# Patient Record
Sex: MALE | URBAN - METROPOLITAN AREA
[De-identification: names, ages, dates, MRNs, and addresses within clinical notes are randomized per-mention and may not be internally consistent; named-entity substitution may affect disease eponyms.]

---

## 2017-03-06 ENCOUNTER — APPOINTMENT (OUTPATIENT)
Age: 72
Setting detail: DERMATOLOGY
End: 2017-03-06

## 2017-03-06 VITALS — HEIGHT: 73 IN | WEIGHT: 189 LBS

## 2017-03-06 DIAGNOSIS — L57.8 OTHER SKIN CHANGES DUE TO CHRONIC EXPOSURE TO NONIONIZING RADIATION: ICD-10-CM

## 2017-03-06 DIAGNOSIS — L30.0 NUMMULAR DERMATITIS: ICD-10-CM

## 2017-03-06 DIAGNOSIS — L82.1 OTHER SEBORRHEIC KERATOSIS: ICD-10-CM

## 2017-03-06 PROCEDURE — OTHER FOLLOW UP FOR NEXT VISIT: OTHER

## 2017-03-06 PROCEDURE — OTHER PRESCRIPTION: OTHER

## 2017-03-06 PROCEDURE — 99203 OFFICE O/P NEW LOW 30 MIN: CPT

## 2017-03-06 PROCEDURE — OTHER TREATMENT REGIMEN: OTHER

## 2017-03-06 PROCEDURE — OTHER COUNSELING: OTHER

## 2017-03-06 PROCEDURE — OTHER REASSURANCE: OTHER

## 2017-03-06 RX ORDER — TRIAMCINOLONE ACETONIDE 1 MG/G
THIN COAT CREAM TOPICAL BID
Qty: 1 | Refills: 2 | Status: ERX | COMMUNITY
Start: 2017-03-06

## 2017-03-06 ASSESSMENT — LOCATION DETAILED DESCRIPTION DERM
LOCATION DETAILED: RIGHT DISTAL CALF
LOCATION DETAILED: RIGHT SUPERIOR UPPER BACK
LOCATION DETAILED: RIGHT MEDIAL UPPER BACK
LOCATION DETAILED: PERIUMBILICAL SKIN
LOCATION DETAILED: LEFT LATERAL SUPERIOR CHEST

## 2017-03-06 ASSESSMENT — LOCATION ZONE DERM
LOCATION ZONE: LEG
LOCATION ZONE: TRUNK

## 2017-03-06 ASSESSMENT — SEVERITY ASSESSMENT: SEVERITY: MILD

## 2017-03-06 ASSESSMENT — LOCATION SIMPLE DESCRIPTION DERM
LOCATION SIMPLE: CHEST
LOCATION SIMPLE: ABDOMEN
LOCATION SIMPLE: RIGHT UPPER BACK
LOCATION SIMPLE: RIGHT CALF

## 2017-03-06 NOTE — PROCEDURE: TREATMENT REGIMEN
Detail Level: Simple
Plan: This is consistent with a small patch of eczema, slightly thickened due to scratching. The topical steroid should make this better fairly quickly and he can use this as needed in the future. Patient will call if no improvement or if he has to use the medication constantly just to keep it under control
Plan: Minimal to no evidence of any visual sun damage, no freckling, no pre-cancers, no skin cancers
Otc Regimen: Dove, Aveeno
Detail Level: Generalized

## 2017-03-06 NOTE — PROCEDURE: REASSURANCE
Include Location In Plan?: Yes
Additional Note: Patient reassured these are benign, no treatment needed, follow up if any changes or irritation
Detail Level: Zone

## 2018-08-10 ENCOUNTER — APPOINTMENT (OUTPATIENT)
Age: 73
Setting detail: DERMATOLOGY
End: 2018-08-13

## 2018-08-10 VITALS — HEIGHT: 73 IN

## 2018-08-10 DIAGNOSIS — L82.1 OTHER SEBORRHEIC KERATOSIS: ICD-10-CM

## 2018-08-10 DIAGNOSIS — L57.8 OTHER SKIN CHANGES DUE TO CHRONIC EXPOSURE TO NONIONIZING RADIATION: ICD-10-CM

## 2018-08-10 DIAGNOSIS — L30.0 NUMMULAR DERMATITIS: ICD-10-CM

## 2018-08-10 PROCEDURE — OTHER COUNSELING: OTHER

## 2018-08-10 PROCEDURE — OTHER TREATMENT REGIMEN: OTHER

## 2018-08-10 PROCEDURE — 99214 OFFICE O/P EST MOD 30 MIN: CPT

## 2018-08-10 PROCEDURE — OTHER FOLLOW UP FOR NEXT VISIT: OTHER

## 2018-08-10 PROCEDURE — OTHER MIPS QUALITY: OTHER

## 2018-08-10 PROCEDURE — OTHER REASSURANCE: OTHER

## 2018-08-10 PROCEDURE — OTHER SUNSCREEN RECOMMENDATIONS: OTHER

## 2018-08-10 ASSESSMENT — LOCATION DETAILED DESCRIPTION DERM
LOCATION DETAILED: LEFT CENTRAL MALAR CHEEK
LOCATION DETAILED: RIGHT KNEE
LOCATION DETAILED: PERIUMBILICAL SKIN
LOCATION DETAILED: STERNUM
LOCATION DETAILED: INFERIOR THORACIC SPINE
LOCATION DETAILED: LEFT KNEE
LOCATION DETAILED: LEFT PROXIMAL DORSAL FOREARM
LOCATION DETAILED: RIGHT DISTAL CALF
LOCATION DETAILED: RIGHT PROXIMAL DORSAL FOREARM

## 2018-08-10 ASSESSMENT — LOCATION SIMPLE DESCRIPTION DERM
LOCATION SIMPLE: LEFT CHEEK
LOCATION SIMPLE: LEFT KNEE
LOCATION SIMPLE: UPPER BACK
LOCATION SIMPLE: LEFT FOREARM
LOCATION SIMPLE: RIGHT KNEE
LOCATION SIMPLE: RIGHT FOREARM
LOCATION SIMPLE: RIGHT CALF
LOCATION SIMPLE: ABDOMEN
LOCATION SIMPLE: CHEST

## 2018-08-10 ASSESSMENT — LOCATION ZONE DERM
LOCATION ZONE: ARM
LOCATION ZONE: TRUNK
LOCATION ZONE: LEG
LOCATION ZONE: FACE

## 2018-08-10 ASSESSMENT — SEVERITY ASSESSMENT: SEVERITY: MILD

## 2018-08-10 ASSESSMENT — BSA RASH: BSA RASH: 2

## 2018-08-10 ASSESSMENT — PAIN INTENSITY VAS: HOW INTENSE IS YOUR PAIN 0 BEING NO PAIN, 10 BEING THE MOST SEVERE PAIN POSSIBLE?: NO PAIN

## 2018-08-10 NOTE — PROCEDURE: REASSURANCE
Detail Level: Zone
Additional Note: Normal basic skin exam today, no evidence of skin cancer or precancer’s. I did encourage the patient to follow up annually for a full body skin exam
Detail Level: Simple
Include Location In Plan?: Yes
Additional Note: Patient reassured these are benign, no treatment needed. Follow up if these change in size or become irritated

## 2018-08-10 NOTE — PROCEDURE: TREATMENT REGIMEN
Detail Level: Simple
Otc Regimen: Dove, Aveeno
Samples Given: Eucrisa, Ultravate lotion
Discontinue Regimen: TAC 0.1% cream for now
Plan: Patient again has patches of dryness on the right and left lateral legs. This appears to be simply some form of mild eczema. He did say the triamcinolone helped a little bit but he ran out of the medication fairly quickly. So I am going to give him a stronger topical steroid to try, if he likes the effect of the medication he can call for a generic prescription. I also want him to try the Eucrisa twice daily and we will send in a prescription for that if he calls to say this is helping. I also strongly encouraged him to stop using Axe body wash and to use dove unscented soap along with Aveeno lotion twice daily. Obviously part of his symptomatology is likely due to his chronic cirrhosis. Regarding follow-up patient is going to start seeing my father in [place] as this is much closer to where he lives

## 2019-04-17 ENCOUNTER — RX ONLY (RX ONLY)
Age: 74
End: 2019-04-17

## 2019-04-17 ENCOUNTER — NEW SKIN PROBLEM (OUTPATIENT)
Dept: URBAN - METROPOLITAN AREA CLINIC 16 | Facility: CLINIC | Age: 74
Setting detail: DERMATOLOGY
End: 2019-04-17

## 2019-04-17 PROBLEM — D18.01 HEMANGIOMA OF SKIN AND SUBCUTANEOUS TISSUE: Status: RESOLVED | Noted: 2019-04-17

## 2019-04-17 PROBLEM — L28.0 LICHEN SIMPLEX CHRONICUS: Status: RESOLVED | Noted: 2019-04-17

## 2019-04-17 PROBLEM — L82.1 OTHER SEBORRHEIC KERATOSIS: Status: RESOLVED | Noted: 2019-04-17

## 2019-04-17 PROCEDURE — 99203 OFFICE O/P NEW LOW 30 MIN: CPT

## 2019-04-17 RX ORDER — TRIAMCINOLONE ACETONIDE 1 MG/G
1 APPLICATION CREAM TOPICAL BID
Qty: 454 | Refills: 5
Start: 2019-04-17

## 2020-01-16 ENCOUNTER — APPOINTMENT (OUTPATIENT)
Age: 75
Setting detail: DERMATOLOGY
End: 2020-01-16

## 2020-01-16 VITALS — HEIGHT: 73 IN | WEIGHT: 170 LBS

## 2020-01-16 DIAGNOSIS — L85.8 OTHER SPECIFIED EPIDERMAL THICKENING: ICD-10-CM

## 2020-01-16 PROCEDURE — 99214 OFFICE O/P EST MOD 30 MIN: CPT

## 2020-01-16 PROCEDURE — OTHER TREATMENT REGIMEN: OTHER

## 2020-01-16 PROCEDURE — OTHER MIPS QUALITY: OTHER

## 2020-01-16 PROCEDURE — OTHER FOLLOW UP FOR NEXT VISIT: OTHER

## 2020-01-16 ASSESSMENT — LOCATION ZONE DERM: LOCATION ZONE: ARM

## 2020-01-16 ASSESSMENT — LOCATION DETAILED DESCRIPTION DERM: LOCATION DETAILED: RIGHT POSTERIOR SHOULDER

## 2020-01-16 ASSESSMENT — LOCATION SIMPLE DESCRIPTION DERM: LOCATION SIMPLE: RIGHT SHOULDER

## 2020-01-16 NOTE — PROCEDURE: TREATMENT REGIMEN
Detail Level: Detailed
Plan: Essentially this appears benign, consistent with a hyperkeratotic papule likely due to friction. No evidence of infection, no specific treatment needed. But I did recommend for the patient to  some scar therapy or scar away something he can place over the lesion when he sleeps at night to help reduce friction. This should lead to resolution. If not Patient will call or follow up

## 2020-05-04 ENCOUNTER — APPOINTMENT (OUTPATIENT)
Age: 75
Setting detail: DERMATOLOGY
End: 2020-05-05

## 2020-05-04 DIAGNOSIS — L92.0 GRANULOMA ANNULARE: ICD-10-CM

## 2020-05-04 PROCEDURE — OTHER FOLLOW UP FOR NEXT VISIT: OTHER

## 2020-05-04 PROCEDURE — 99214 OFFICE O/P EST MOD 30 MIN: CPT

## 2020-05-04 PROCEDURE — OTHER PRESCRIPTION: OTHER

## 2020-05-04 PROCEDURE — OTHER COUNSELING: OTHER

## 2020-05-04 PROCEDURE — OTHER TREATMENT REGIMEN: OTHER

## 2020-05-04 RX ORDER — CLOBETASOL PROPIONATE 0.5 MG/G
THIN COAT OINTMENT TOPICAL BID
Qty: 1 | Refills: 1 | Status: ERX | COMMUNITY
Start: 2020-05-04

## 2020-05-04 ASSESSMENT — BSA RASH: BSA RASH: 1

## 2020-05-04 ASSESSMENT — LOCATION ZONE DERM: LOCATION ZONE: FEET

## 2020-05-04 ASSESSMENT — LOCATION SIMPLE DESCRIPTION DERM: LOCATION SIMPLE: LEFT FOOT

## 2020-05-04 ASSESSMENT — LOCATION DETAILED DESCRIPTION DERM: LOCATION DETAILED: LEFT DORSAL FOOT

## 2020-05-04 ASSESSMENT — SEVERITY ASSESSMENT: SEVERITY: MILD

## 2020-05-04 NOTE — HPI: RASH
What Type Of Note Output Would You Prefer (Optional)?: Bullet Format
Is The Patient Presenting As Previously Scheduled?: Yes
How Severe Is Your Rash?: mild
Is This A New Presentation, Or A Follow-Up?: Rash
Additional History: Patient with a red, scaly, itchy rash on the top of his foot for the past one month. No other skin surfaces are affected

## 2020-05-04 NOTE — PROCEDURE: TREATMENT REGIMEN
Detail Level: Simple
Plan: Prescription for Clobetasol ointment twice a day for the next 2 to 4 weeks. He will call or follow up if no improvement or if there is any worsening

## 2020-06-02 ENCOUNTER — RX ONLY (RX ONLY)
Age: 75
End: 2020-06-02

## 2020-06-02 RX ORDER — TRIAMCINOLONE ACETONIDE 1 MG/G
THIN COAT CREAM TOPICAL DAILY
Qty: 1 | Refills: 2 | Status: ERX

## 2020-11-30 ENCOUNTER — APPOINTMENT (OUTPATIENT)
Age: 75
Setting detail: DERMATOLOGY
End: 2020-12-01

## 2020-11-30 DIAGNOSIS — L72.8 OTHER FOLLICULAR CYSTS OF THE SKIN AND SUBCUTANEOUS TISSUE: ICD-10-CM

## 2020-11-30 PROCEDURE — OTHER FOLLOW UP FOR NEXT VISIT: OTHER

## 2020-11-30 PROCEDURE — OTHER TREATMENT REGIMEN: OTHER

## 2020-11-30 PROCEDURE — 99213 OFFICE O/P EST LOW 20 MIN: CPT | Mod: 95

## 2020-11-30 ASSESSMENT — LOCATION SIMPLE DESCRIPTION DERM: LOCATION SIMPLE: RIGHT TEMPLE

## 2020-11-30 ASSESSMENT — LOCATION ZONE DERM: LOCATION ZONE: FACE

## 2020-11-30 ASSESSMENT — LOCATION DETAILED DESCRIPTION DERM: LOCATION DETAILED: RIGHT MEDIAL TEMPLE

## 2020-11-30 NOTE — PROCEDURE: TREATMENT REGIMEN
Detail Level: Detailed
Plan: From the photos and today’s video visit this appears consistent with a sebaceous cyst. There is no current active inflammation. Patient will monitor and call or follow up if this worsens. Of note he is about to leave for the [facility] on Friday for a liver transplantation so he will make sure he has the physicians there take a look at the lesion. However, he can follow up with me anytime

## 2020-11-30 NOTE — HPI: EVALUATION OF SKIN LESION(S)
What Type Of Note Output Would You Prefer (Optional)?: Bullet Format
How Severe Are Your Spot(S)?: moderate
Have Your Spot(S) Been Treated In The Past?: has not been treated
Hpi Title: Evaluation of a Skin Lesion
Additional History: TeleHealth via Zoom. Patient had a raised, tender what sounds like sebaceous cyst on his right temple approximately one week ago. It has since improved but he wanted to make sure it didn’t represent anything dangerous. He did use antibiotic ointment and warm compresses which seem to help. There was no spontaneous drainage. He does have a previous history of sebaceous cyst in the area

## 2021-11-18 NOTE — HPI: SKIN LESIONS
How Severe Is Your Skin Lesion?: mild
Have Your Skin Lesions Been Treated?: not been treated
Is This A New Presentation, Or A Follow-Up?: Growth
Additional History: Patient has a raised, rough, slightly tender lesion on the back of his right shoulder. He wonders if this might be related to a friction or bedsore as he has had to remain prone in a specific position for extended periods of time due to history of liver disease and fluid retention as well as pulmonary edema
independent